# Patient Record
Sex: MALE | Race: WHITE | Employment: FULL TIME | URBAN - METROPOLITAN AREA
[De-identification: names, ages, dates, MRNs, and addresses within clinical notes are randomized per-mention and may not be internally consistent; named-entity substitution may affect disease eponyms.]

---

## 2018-12-07 ENCOUNTER — HOSPITAL ENCOUNTER (OUTPATIENT)
Age: 58
Discharge: HOME | End: 2018-12-09
Payer: COMMERCIAL

## 2018-12-07 ENCOUNTER — HOSPITAL ENCOUNTER (OUTPATIENT)
Dept: GENERAL RADIOLOGY | Age: 58
Discharge: HOME | End: 2018-12-09
Payer: COMMERCIAL

## 2018-12-07 DIAGNOSIS — R05.9 COUGH: ICD-10-CM

## 2018-12-07 PROCEDURE — 71046 X-RAY EXAM CHEST 2 VIEWS: CPT

## 2021-11-14 ENCOUNTER — APPOINTMENT (OUTPATIENT)
Dept: GENERAL RADIOLOGY | Age: 61
End: 2021-11-14
Payer: COMMERCIAL

## 2021-11-14 ENCOUNTER — HOSPITAL ENCOUNTER (EMERGENCY)
Age: 61
Discharge: HOME | End: 2021-11-14
Attending: STUDENT IN AN ORGANIZED HEALTH CARE EDUCATION/TRAINING PROGRAM
Payer: COMMERCIAL

## 2021-11-14 VITALS
OXYGEN SATURATION: 100 % | HEART RATE: 68 BPM | SYSTOLIC BLOOD PRESSURE: 158 MMHG | RESPIRATION RATE: 16 BRPM | BODY MASS INDEX: 28 KG/M2 | TEMPERATURE: 98.2 F | DIASTOLIC BLOOD PRESSURE: 91 MMHG | HEIGHT: 71 IN | WEIGHT: 200 LBS

## 2021-11-14 DIAGNOSIS — I10 HYPERTENSION, UNSPECIFIED TYPE: ICD-10-CM

## 2021-11-14 DIAGNOSIS — R07.9 CHEST PAIN, UNSPECIFIED TYPE: Primary | ICD-10-CM

## 2021-11-14 LAB
ALBUMIN SERPL-MCNC: 3.9 G/DL (ref 3.5–5.2)
ALP BLD-CCNC: 85 U/L (ref 40–129)
ALT SERPL-CCNC: 55 U/L (ref 0–40)
ANION GAP SERPL CALCULATED.3IONS-SCNC: 10 MMOL/L (ref 7–16)
AST SERPL-CCNC: 46 U/L (ref 0–39)
BASOPHILS ABSOLUTE: 0.02 E9/L (ref 0–0.2)
BASOPHILS RELATIVE PERCENT: 0.3 % (ref 0–2)
BILIRUB SERPL-MCNC: 0.4 MG/DL (ref 0–1.2)
BUN BLDV-MCNC: 31 MG/DL (ref 6–23)
CALCIUM SERPL-MCNC: 9.6 MG/DL (ref 8.6–10.2)
CHLORIDE BLD-SCNC: 106 MMOL/L (ref 98–107)
CO2: 25 MMOL/L (ref 22–29)
CREAT SERPL-MCNC: 2.1 MG/DL (ref 0.7–1.2)
EKG ATRIAL RATE: 59 BPM
EKG P AXIS: 61 DEGREES
EKG P-R INTERVAL: 184 MS
EKG Q-T INTERVAL: 422 MS
EKG QRS DURATION: 88 MS
EKG QTC CALCULATION (BAZETT): 417 MS
EKG R AXIS: 2 DEGREES
EKG T AXIS: 73 DEGREES
EKG VENTRICULAR RATE: 59 BPM
EOSINOPHILS ABSOLUTE: 0.17 E9/L (ref 0.05–0.5)
EOSINOPHILS RELATIVE PERCENT: 2.3 % (ref 0–6)
GFR AFRICAN AMERICAN: 39
GFR NON-AFRICAN AMERICAN: 32 ML/MIN/1.73
GLUCOSE BLD-MCNC: 187 MG/DL (ref 74–99)
HCT VFR BLD CALC: 42.5 % (ref 37–54)
HEMOGLOBIN: 13.9 G/DL (ref 12.5–16.5)
IMMATURE GRANULOCYTES #: 0.06 E9/L
IMMATURE GRANULOCYTES %: 0.8 % (ref 0–5)
LYMPHOCYTES ABSOLUTE: 1.3 E9/L (ref 1.5–4)
LYMPHOCYTES RELATIVE PERCENT: 17.6 % (ref 20–42)
MCH RBC QN AUTO: 30.5 PG (ref 26–35)
MCHC RBC AUTO-ENTMCNC: 32.7 % (ref 32–34.5)
MCV RBC AUTO: 93.4 FL (ref 80–99.9)
METER GLUCOSE: 225 MG/DL (ref 74–99)
MONOCYTES ABSOLUTE: 0.91 E9/L (ref 0.1–0.95)
MONOCYTES RELATIVE PERCENT: 12.3 % (ref 2–12)
NEUTROPHILS ABSOLUTE: 4.91 E9/L (ref 1.8–7.3)
NEUTROPHILS RELATIVE PERCENT: 66.7 % (ref 43–80)
PDW BLD-RTO: 13.2 FL (ref 11.5–15)
PLATELET # BLD: 187 E9/L (ref 130–450)
PMV BLD AUTO: 10.1 FL (ref 7–12)
POTASSIUM REFLEX MAGNESIUM: 3.8 MMOL/L (ref 3.5–5)
RBC # BLD: 4.55 E12/L (ref 3.8–5.8)
SODIUM BLD-SCNC: 141 MMOL/L (ref 132–146)
TOTAL PROTEIN: 6 G/DL (ref 6.4–8.3)
TROPONIN, HIGH SENSITIVITY: 34 NG/L (ref 0–11)
TROPONIN, HIGH SENSITIVITY: 37 NG/L (ref 0–11)
TROPONIN, HIGH SENSITIVITY: 40 NG/L (ref 0–11)
TROPONIN, HIGH SENSITIVITY: 41 NG/L (ref 0–11)
WBC # BLD: 7.4 E9/L (ref 4.5–11.5)

## 2021-11-14 PROCEDURE — 99285 EMERGENCY DEPT VISIT HI MDM: CPT

## 2021-11-14 PROCEDURE — 6370000000 HC RX 637 (ALT 250 FOR IP): Performed by: FAMILY MEDICINE

## 2021-11-14 PROCEDURE — 93010 ELECTROCARDIOGRAM REPORT: CPT | Performed by: INTERNAL MEDICINE

## 2021-11-14 PROCEDURE — 82962 GLUCOSE BLOOD TEST: CPT

## 2021-11-14 PROCEDURE — 84484 ASSAY OF TROPONIN QUANT: CPT

## 2021-11-14 PROCEDURE — 36415 COLL VENOUS BLD VENIPUNCTURE: CPT

## 2021-11-14 PROCEDURE — 71045 X-RAY EXAM CHEST 1 VIEW: CPT

## 2021-11-14 PROCEDURE — 80053 COMPREHEN METABOLIC PANEL: CPT

## 2021-11-14 PROCEDURE — 85025 COMPLETE CBC W/AUTO DIFF WBC: CPT

## 2021-11-14 PROCEDURE — 93005 ELECTROCARDIOGRAM TRACING: CPT | Performed by: STUDENT IN AN ORGANIZED HEALTH CARE EDUCATION/TRAINING PROGRAM

## 2021-11-14 RX ORDER — FENTANYL CITRATE 50 UG/ML
25 INJECTION, SOLUTION INTRAMUSCULAR; INTRAVENOUS ONCE
Status: DISCONTINUED | OUTPATIENT
Start: 2021-11-14 | End: 2021-11-15 | Stop reason: HOSPADM

## 2021-11-14 RX ADMIN — ASPIRIN 325 MG: 325 TABLET, COATED ORAL at 06:29

## 2021-11-14 ASSESSMENT — PAIN DESCRIPTION - LOCATION: LOCATION: CHEST

## 2021-11-14 ASSESSMENT — PAIN DESCRIPTION - PAIN TYPE: TYPE: ACUTE PAIN

## 2021-11-14 ASSESSMENT — ENCOUNTER SYMPTOMS
CHEST TIGHTNESS: 0
SORE THROAT: 0
CONSTIPATION: 0
NAUSEA: 0
DIARRHEA: 0
COUGH: 0
ABDOMINAL PAIN: 0
VOMITING: 0
SHORTNESS OF BREATH: 0
RHINORRHEA: 0

## 2021-11-14 ASSESSMENT — PAIN SCALES - GENERAL
PAINLEVEL_OUTOF10: 0
PAINLEVEL_OUTOF10: 1

## 2021-11-14 NOTE — ED NOTES
Pt to ed for c/o chest pain, that woke him up, pt states it felt like the same as when he had a MI in January of 2021. Pt took a Nitro prior to arrival and relieved his pain. Pt is a/ox3. VSS. Wife at bedside.       Mario Medina RN  11/14/21 7827

## 2021-11-14 NOTE — ED PROVIDER NOTES
[facility]  Department of Emergency Medicine     Written by: Zak Oconnor DO  Patient Name: Brandyn Matias  Attending Provider: No att. providers found  Admit Date: 2021  6:04 AM  MRN: [id number]                   : 1960        Chief Complaint   Patient presents with    Chest Pain    - Chief complaint    Gabino Acosta is a 57 y.o. male past medical history of CKD, lymphoma, previous MI requiring stenting 2021 currently on dual antiplatelet therapy who presents to the ED with a chief complaint of chest pain. He states that he went to bed with some substernal discomfort last night and thought that it was heartburn. He woke up this morning at about 4:00 with continued substernal chest pain located in the middle of his chest.  He took a nitro at that time which improved the pain. States that the pain did come back after taking the nitro. He took a 2nd and it completely resolved the pain. Describes the pain as pressure and is non radiating. It is not affected by exertion. He denies any diaphoresis, fever, chills, shortness of breath, abdominal pain, nausea, vomiting. Review of Systems   Constitutional: Negative for chills, fatigue and fever. HENT: Negative for congestion, rhinorrhea and sore throat. Respiratory: Negative for cough, chest tightness and shortness of breath. Cardiovascular: Positive for chest pain. Negative for palpitations and leg swelling. Gastrointestinal: Negative for abdominal pain, constipation, diarrhea, nausea and vomiting. Genitourinary: Negative for dysuria and frequency. Neurological: Negative for dizziness and light-headedness. Psychiatric/Behavioral: Negative for confusion. All other systems reviewed and are negative. Physical Exam  Constitutional:       General: He is not in acute distress. Appearance: Normal appearance. HENT:      Head: Normocephalic and atraumatic.       Mouth/Throat:      Mouth: Mucous membranes are moist.      Pharynx: Oropharynx is clear. Eyes:      Extraocular Movements: Extraocular movements intact. Conjunctiva/sclera: Conjunctivae normal.   Cardiovascular:      Rate and Rhythm: Normal rate and regular rhythm. Pulses: Normal pulses. Heart sounds: Murmur (systolic) heard. Pulmonary:      Effort: Pulmonary effort is normal.      Breath sounds: Normal breath sounds. No wheezing. Abdominal:      General: Bowel sounds are normal. There is no distension. Palpations: Abdomen is soft. Tenderness: There is no abdominal tenderness. Musculoskeletal:         General: No swelling. Cervical back: Normal range of motion. Skin:     General: Skin is warm and dry. Neurological:      General: No focal deficit present. Mental Status: He is alert and oriented to person, place, and time. Cranial Nerves: No cranial nerve deficit. Psychiatric:         Attention and Perception: Attention normal.         Mood and Affect: Mood normal.          Procedures       MDM  Number of Diagnoses or Management Options  Chest pain, unspecified type  Hypertension, unspecified type  Diagnosis management comments: Brandyn Matias is a 57 y.o. male who presented to the ED with a chief complaint of chest pain. Patient was hemodynamically stable. Vitals were within normal limits. EKG showed sinus bradycardia with a rate of 59 bpm with no ST elevations or depressions. Laboratory tests were significant for elevated troponin of 41 with a repeat of 40 and 37, elevated creatinine of 2.1, otherwise unremarkable. Chest x-ray showed no acute process. With recent stent in January 2021 and chest pain that responded to nitroglycerin the decision was made to admit the patient. Patient requested he be transferred to [facility] to be seen by his cardiologist Dr. Christiana Galeano. Explained to patient that there could be a long wait times and we were not sure when he would get a bed.   He was still agreeable to be transferred to [facility] clinic discussed case with Dr. Magui Snyder at [facility] clinic who accepted the patient. ED Course as of 12/04/21 0138   Sun Nov 14, 2021   6291 EKG: This EKG is signed and interpreted by me. Rate: 59  Rhythm: Sinus  Interpretation: sinus bradycardia, no ST-Elevations or Depressions, , QRS 88, QTc 417  Comparison: no previous EKG available    [BB]   5187 ATTENDING PROVIDER ATTESTATION:     I have personally performed and/or participated in the history, exam, medical decision making, and procedures and agree with all pertinent clinical information unless otherwise noted. I have also reviewed and agree with the past medical, family and social history unless otherwise noted. I have discussed this patient in detail with the resident, and provided the instruction and education regarding the patient. My findings/plan: He has history of renal transplant, stent placed to being this year, CAD who is presenting for evaluation of chest discomfort. Patient notes that yesterday evening he began to have what he felt was initially indigestion. Notes that he took a nitro for the pain. Upon awakening this morning continue to have some chest pressure, took nitro and currently is pain-free. States that the symptoms are similar to what he felt when he had his heart attack earlier this year. Currently patient is in bed no acute distress. Denies any dizziness, lightness, chest pain or shortness of breath, nausea, vomiting, diarrhea. Notes that he did not have any episodes of diaphoresis when he is chest discomfort. Plan for imaging, labs, supportive care, likely admission. Patient states that he would like to be transferred to the [facility] clinic visits where he had a stent placed, discussed with him long wait times at this time. [BB]   1935 Patient to use his home medications that he brought. Still waiting for bed. [JV]   4366 I reevaluated patient at bedside. EKG was normal sinus. Troponins trended down. Patient states that he would rather go to the [facility] emergency room to be evaluated at this time. He wants to be closer to the doctors that take care of him. He is alert and oriented x4. He understands his situation. States that he is here for chest pain and will be transferred for cardiac work-up at the [facility]. States that he wants to leave [address] Patient was informed that he possibly could die or have permanent disability if he leaves AGAINST MEDICAL ADVICE. Mother attempted patient stable. Patient states that he would rather go. Patient will be leaving AGAINST MEDICAL ADVICE. [JV]      ED Course User Index  [BB] Gaby Hernandez DO  [JV] Adilene Martinez MD        ED Course as of 12/04/21 0138   Sun Nov 14, 2021   5017 EKG: This EKG is signed and interpreted by me. Rate: 59  Rhythm: Sinus  Interpretation: sinus bradycardia, no ST-Elevations or Depressions, , QRS 88, QTc 417  Comparison: no previous EKG available    [BB]   0080 ATTENDING PROVIDER ATTESTATION:     I have personally performed and/or participated in the history, exam, medical decision making, and procedures and agree with all pertinent clinical information unless otherwise noted. I have also reviewed and agree with the past medical, family and social history unless otherwise noted. I have discussed this patient in detail with the resident, and provided the instruction and education regarding the patient. My findings/plan: He has history of renal transplant, stent placed to being this year, CAD who is presenting for evaluation of chest discomfort. Patient notes that yesterday evening he began to have what he felt was initially indigestion. Notes that he took a nitro for the pain. Upon awakening this morning continue to have some chest pressure, took nitro and currently is pain-free. States that the symptoms are similar to what he felt when he had his heart attack earlier this year. Currently patient is in bed no acute distress. Denies any dizziness, lightness, chest pain or shortness of breath, nausea, vomiting, diarrhea. Notes that he did not have any episodes of diaphoresis when he is chest discomfort. Plan for imaging, labs, supportive care, likely admission. Patient states that he would like to be transferred to the [facility] visits where he had a stent placed, discussed with him long wait times at this time. [BB]   1935 Patient to use his home medications that he brought. Still waiting for bed. [JV]   7647 I reevaluated patient at bedside. EKG was normal sinus. Troponins trended down. Patient states that he would rather go to the [facility] emergency room to be evaluated at this time. He wants to be closer to the doctors that take care of him. He is alert and oriented x4. He understands his situation. States that he is here for chest pain and will be transferred for cardiac work-up at the [facility]. States that he wants to leave [address] Patient was informed that he possibly could die or have permanent disability if he leaves AGAINST MEDICAL ADVICE. Mother attempted patient stable. Patient states that he would rather go. Patient will be leaving AGAINST MEDICAL ADVICE. [JV]      ED Course User Index  [BB] Colten Metzger DO  [JV] Louise Corey MD       --------------------------------------------- PAST HISTORY ---------------------------------------------  Past Medical History:  has a past medical history of Cancer ([facility] [address] CKD (chronic kidney disease), [facility] Diabetes mellitus [facility] Hypercholesteremia, Hypertension, MI (myocardial infarction) ([facility] Myocardial infarction ([facility] and Proteinuria. Past Surgical History:  has a past surgical history that includes skin biopsy; back surgery; Colonoscopy; and Kidney transplant. Social History:  reports that he has never smoked.  He has never used smokeless tobacco. He reports current alcohol use. He reports that he does not use drugs. Family History: family history includes Heart Attack in his father; Kidney Disease in his mother. The patients home medications have been reviewed.     Allergies: Doxycycline, Ibuprofen, Ramipril, Cardura [doxazosin mesylate], and Keflex [cephalexin]    -------------------------------------------------- RESULTS -------------------------------------------------    Lab  Results for orders placed or performed during the hospital encounter of 11/14/21   CBC Auto Differential   Result Value Ref Range    WBC 7.4 4.5 - 11.5 E9/L    RBC 4.55 3.80 - 5.80 E12/L    Hemoglobin 13.9 12.5 - 16.5 g/dL    Hematocrit 42.5 37.0 - 54.0 %    MCV 93.4 80.0 - 99.9 fL    MCH 30.5 26.0 - 35.0 pg    MCHC 32.7 32.0 - 34.5 %    RDW 13.2 11.5 - 15.0 fL    Platelets 520 950 - 832 E9/L    MPV 10.1 7.0 - 12.0 fL    Neutrophils % 66.7 43.0 - 80.0 %    Immature Granulocytes % 0.8 0.0 - 5.0 %    Lymphocytes % 17.6 (L) 20.0 - 42.0 %    Monocytes % 12.3 (H) 2.0 - 12.0 %    Eosinophils % 2.3 0.0 - 6.0 %    Basophils % 0.3 0.0 - 2.0 %    Neutrophils Absolute 4.91 1.80 - 7.30 E9/L    Immature Granulocytes # 0.06 E9/L    Lymphocytes Absolute 1.30 (L) 1.50 - 4.00 E9/L    Monocytes Absolute 0.91 0.10 - 0.95 E9/L    Eosinophils Absolute 0.17 0.05 - 0.50 E9/L    Basophils Absolute 0.02 0.00 - 0.20 E9/L   Comprehensive Metabolic Panel w/ Reflex to MG   Result Value Ref Range    Sodium 141 132 - 146 mmol/L    Potassium reflex Magnesium 3.8 3.5 - 5.0 mmol/L    Chloride 106 98 - 107 mmol/L    CO2 25 22 - 29 mmol/L    Anion Gap 10 7 - 16 mmol/L    Glucose 187 (H) 74 - 99 mg/dL    BUN 31 (H) 6 - 23 mg/dL    CREATININE 2.1 (H) 0.7 - 1.2 mg/dL    GFR Non-African American 32 >=60 mL/min/1.73    GFR African American 39     Calcium 9.6 8.6 - 10.2 mg/dL    Total Protein 6.0 (L) 6.4 - 8.3 g/dL    Albumin 3.9 3.5 - 5.2 g/dL    Total Bilirubin 0.4 0.0 - 1.2 mg/dL Alkaline Phosphatase 85 40 - 129 U/L    ALT 55 (H) 0 - 40 U/L    AST 46 (H) 0 - 39 U/L   Troponin   Result Value Ref Range    Troponin, High Sensitivity 41 (H) 0 - 11 ng/L   Troponin   Result Value Ref Range    Troponin, High Sensitivity 40 (H) 0 - 11 ng/L   Troponin   Result Value Ref Range    Troponin, High Sensitivity 37 (H) 0 - 11 ng/L   Troponin   Result Value Ref Range    Troponin, High Sensitivity 34 (H) 0 - 11 ng/L   POCT Glucose   Result Value Ref Range    Meter Glucose 225 (H) 74 - 99 mg/dL   EKG 12 Lead   Result Value Ref Range    Ventricular Rate 59 BPM    Atrial Rate 59 BPM    P-R Interval 184 ms    QRS Duration 88 ms    Q-T Interval 422 ms    QTc Calculation (Bazett) 417 ms    P Axis 61 degrees    R Axis 2 degrees    T Axis 73 degrees   EKG 12 Lead   Result Value Ref Range    Ventricular Rate 65 BPM    Atrial Rate 65 BPM    P-R Interval 176 ms    QRS Duration 82 ms    Q-T Interval 410 ms    QTc Calculation (Bazett) 426 ms    P Axis 51 degrees    R Axis 7 degrees    T Axis 78 degrees       Radiology  XR CHEST PORTABLE   Final Result   No acute cardiopulmonary process is identified by portable chest x-ray. RECOMMENDATION:   PA and lateral exam may better evaluate  as clinically warranted. EKG:  This EKG is signed and interpreted by me. Rate: 59  Rhythm: Sinus  Interpretation: sinus bradycardia  Comparison: no previous EKG available      ------------------------- NURSING NOTES AND VITALS REVIEWED ---------------------------  Date / Time Roomed:  11/14/2021  6:04 AM  ED Bed Assignment:  21/21    The nursing notes within the ED encounter and vital signs as below have been reviewed. No data found. Oxygen Saturation Interpretation: Normal      ------------------------------------------ PROGRESS NOTES ------------------------------------------  Re-evaluation(s):  Time: 1100. Patients symptoms show no change  Repeat physical examination is not changed    Time: 1230.   Patients symptoms show no change  Repeat physical examination is not changed    I have spoken with the patient and discussed todays results, in addition to providing specific details for the plan of care and counseling regarding the diagnosis and prognosis. Their questions are answered at this time and they are agreeable with the plan.      --------------------------------- ADDITIONAL PROVIDER NOTES ---------------------------------  Consultations:  Spoke with Dr. Payton Velazoc from [facility],  They will admit this patient. This patient's ED course included: a personal history and physicial examination, re-evaluation prior to disposition, multiple bedside re-evaluations, cardiac monitoring and continuous pulse oximetry    This patient has remained hemodynamically stable during their ED course. Please note that the withdrawal or failure to initiate urgent interventions for this patient would likely result in a life threatening deterioration or permanent disability. Clinical Impression  1. Chest pain, unspecified type    2. Hypertension, unspecified type          Disposition  Patient's disposition: Transfer to [facility]  Patient's condition is fair. Patient was seen and evaluated by myself and my attending No att. providers found. Assessment and Plan discussed with attending provider, please see attestation for final plan of care.      DO Sarah Roman DO  Resident  11/14/21 Steve Campos, DO  12/04/21 7799

## 2021-11-15 LAB
EKG ATRIAL RATE: 65 BPM
EKG P AXIS: 51 DEGREES
EKG P-R INTERVAL: 176 MS
EKG Q-T INTERVAL: 410 MS
EKG QRS DURATION: 82 MS
EKG QTC CALCULATION (BAZETT): 426 MS
EKG R AXIS: 7 DEGREES
EKG T AXIS: 78 DEGREES
EKG VENTRICULAR RATE: 65 BPM

## 2021-11-15 PROCEDURE — 93010 ELECTROCARDIOGRAM REPORT: CPT | Performed by: INTERNAL MEDICINE

## 2021-11-15 NOTE — ED PROVIDER NOTES
65.  Normal sinus rhythm. Normal axis deviation. No QTC prolongation. Stable as compared to previous EKG. Oxygen Saturation Interpretation: Normal    The cardiac monitor revealed sinus rhythm with a heart rate in the 80s as interpreted by me. The cardiac monitor was ordered secondary to the patient's heart rate and to monitor the patient for dysrhythmia. Time: 2100  Re-evaluation. Patients symptoms show no change  Repeat physical examination is not changed      MDM: Patient accepted for transfer for chest pain observation at the [facility] around 9:00 this morning. [facility] has no beds available currently and patient is waiting for transport for available bed. Patient has been in the department for over 12 hours. Called into the room because the patient states that he had chest pain. States him and his wife are also hungry and would like something to eat. He adds that he has his home medications that he would like to take. Because the patient is a vague chest pain is refusing EKG and a troponin. EKG was normal sinus rhythm. No signs of acute ischemia. Delta troponin x3 trending down. I informed patient if he should take his home medications that he has in his pocket. During evaluation patient and his wife state that they want to leave [address] because they want to drive up to the [facility] to get a bed. They state that they want to be evaluated at their emergency department. I informed patient that if he leaves AGAINST MEDICAL ADVICE he could cause him serious injury that may be permanent or even die. Patient states that he understands. He is alert and oriented x4 and patient left AGAINST MEDICAL ADVICE. Accordingly this patient received 0 minutes of critical care time, excluding separately billable procedures.          Kacie Dallas MD  Resident  11/15/21 1007

## 2021-11-15 NOTE — ED NOTES
RN answered pts call light at this time. Pt states he wants to leave AMA. RN took pts vitals and made physician aware that pt wishes to leave AMA.      Sugey Doss RN  11/14/21 7809

## 2021-11-15 NOTE — ED NOTES
Patient's vitals were obtained and Patient was asked if he wanted anything else for pain. Patient stated 'No, because his pain comes and goes. Patient stated right now that he is not in any pain.      Faith Held  11/14/21 2012

## 2021-11-15 NOTE — ED NOTES
This nurse came onto shift when patient put his call light on to ask questions about the transfer to [address] [place] [address] Road. The [facility] at this time to verify insurance coverage. Patient was told that [facility]. Patient asked how long the transfer will take. Patient was told that [facility] still has to call back when a room has became available. The patient was told that [facility] still had no rooms available at 1900. Patient stated that he has not got the care he needs and no one has brought him anything to eat. A follow up with patient's nurse was asked at this time if she ordered the patient a meal. The patient's nurse stated she did order him a meal. A box lunch was offered to the patient and the patient said no due to his special meal diet he has he can not eat lunch meat. A offer was made to patient's family member that she coud leave to go get something that he would like or would be more suitable to his diet and she would be allowed back in. Patient said No to that as well.       Dawit Rodriguez Held  11/14/21 2002       Faith Held  11/14/21 2005

## 2021-11-15 NOTE — ED NOTES
Spoke with patient's daughter at this time, concern that patient has not seen a physician for \"a while\", Dr. Tan Tenorio aware. Staff sent to room to check on patient and address any needs. Will monitor.      Hoda Hickman RN  11/14/21 1924

## 2021-11-15 NOTE — ED NOTES
Patient refused pain medication at this time. Doctor and Nurse notified.      Faith Held  11/14/21 2002

## 2021-11-24 ENCOUNTER — HOSPITAL ENCOUNTER (OUTPATIENT)
Dept: CARDIAC REHAB | Age: 61
Setting detail: THERAPIES SERIES
Discharge: HOME | End: 2021-11-24
Payer: COMMERCIAL

## 2021-11-24 VITALS
DIASTOLIC BLOOD PRESSURE: 69 MMHG | HEART RATE: 68 BPM | OXYGEN SATURATION: 95 % | HEIGHT: 71 IN | WEIGHT: 204 LBS | RESPIRATION RATE: 20 BRPM | SYSTOLIC BLOOD PRESSURE: 112 MMHG | BODY MASS INDEX: 28.56 KG/M2

## 2021-11-24 RX ORDER — EZETIMIBE 10 MG/1
10 TABLET ORAL DAILY
COMMUNITY

## 2021-11-24 RX ORDER — ROSUVASTATIN CALCIUM 40 MG/1
40 TABLET, COATED ORAL EVERY EVENING
COMMUNITY

## 2021-11-24 ASSESSMENT — PATIENT HEALTH QUESTIONNAIRE - PHQ9
SUM OF ALL RESPONSES TO PHQ QUESTIONS 1-9: 3
SUM OF ALL RESPONSES TO PHQ QUESTIONS 1-9: 3

## 2021-11-29 ENCOUNTER — HOSPITAL ENCOUNTER (OUTPATIENT)
Dept: CARDIAC REHAB | Age: 61
Setting detail: THERAPIES SERIES
Discharge: HOME | End: 2021-11-29
Payer: COMMERCIAL

## 2021-11-29 PROCEDURE — 93798 PHYS/QHP OP CAR RHAB W/ECG: CPT

## 2021-12-01 ENCOUNTER — HOSPITAL ENCOUNTER (OUTPATIENT)
Dept: CARDIAC REHAB | Age: 61
Setting detail: THERAPIES SERIES
Discharge: HOME | End: 2021-12-01
Payer: COMMERCIAL

## 2021-12-01 PROCEDURE — 93798 PHYS/QHP OP CAR RHAB W/ECG: CPT

## 2021-12-03 ENCOUNTER — HOSPITAL ENCOUNTER (OUTPATIENT)
Dept: CARDIAC REHAB | Age: 61
Setting detail: THERAPIES SERIES
Discharge: HOME | End: 2021-12-03
Payer: COMMERCIAL

## 2021-12-03 PROCEDURE — 93798 PHYS/QHP OP CAR RHAB W/ECG: CPT

## 2021-12-06 ENCOUNTER — HOSPITAL ENCOUNTER (OUTPATIENT)
Dept: CARDIAC REHAB | Age: 61
Setting detail: THERAPIES SERIES
Discharge: HOME | End: 2021-12-06
Payer: COMMERCIAL

## 2021-12-06 PROCEDURE — 93798 PHYS/QHP OP CAR RHAB W/ECG: CPT

## 2021-12-08 ENCOUNTER — HOSPITAL ENCOUNTER (OUTPATIENT)
Dept: CARDIAC REHAB | Age: 61
Setting detail: THERAPIES SERIES
Discharge: HOME | End: 2021-12-08
Payer: COMMERCIAL

## 2021-12-08 PROCEDURE — 93798 PHYS/QHP OP CAR RHAB W/ECG: CPT

## 2021-12-10 ENCOUNTER — HOSPITAL ENCOUNTER (OUTPATIENT)
Dept: CARDIAC REHAB | Age: 61
Setting detail: THERAPIES SERIES
Discharge: HOME | End: 2021-12-10
Payer: COMMERCIAL

## 2021-12-10 PROCEDURE — 93798 PHYS/QHP OP CAR RHAB W/ECG: CPT

## 2021-12-13 ENCOUNTER — HOSPITAL ENCOUNTER (OUTPATIENT)
Dept: CARDIAC REHAB | Age: 61
Setting detail: THERAPIES SERIES
Discharge: HOME | End: 2021-12-13
Payer: COMMERCIAL

## 2021-12-13 PROCEDURE — 93798 PHYS/QHP OP CAR RHAB W/ECG: CPT

## 2021-12-15 ENCOUNTER — HOSPITAL ENCOUNTER (OUTPATIENT)
Dept: CARDIAC REHAB | Age: 61
Setting detail: THERAPIES SERIES
Discharge: HOME | End: 2021-12-15
Payer: COMMERCIAL

## 2021-12-15 PROCEDURE — 93798 PHYS/QHP OP CAR RHAB W/ECG: CPT

## 2021-12-17 ENCOUNTER — HOSPITAL ENCOUNTER (OUTPATIENT)
Dept: CARDIAC REHAB | Age: 61
Setting detail: THERAPIES SERIES
Discharge: HOME | End: 2021-12-17
Payer: COMMERCIAL

## 2021-12-17 PROCEDURE — 93798 PHYS/QHP OP CAR RHAB W/ECG: CPT

## 2021-12-20 ENCOUNTER — HOSPITAL ENCOUNTER (OUTPATIENT)
Dept: CARDIAC REHAB | Age: 61
Setting detail: THERAPIES SERIES
Discharge: HOME | End: 2021-12-20
Payer: COMMERCIAL

## 2021-12-20 PROCEDURE — 93798 PHYS/QHP OP CAR RHAB W/ECG: CPT

## 2021-12-22 ENCOUNTER — HOSPITAL ENCOUNTER (OUTPATIENT)
Dept: CARDIAC REHAB | Age: 61
Setting detail: THERAPIES SERIES
Discharge: HOME | End: 2021-12-22
Payer: COMMERCIAL

## 2021-12-22 PROCEDURE — 93798 PHYS/QHP OP CAR RHAB W/ECG: CPT

## 2021-12-27 ENCOUNTER — HOSPITAL ENCOUNTER (OUTPATIENT)
Dept: CARDIAC REHAB | Age: 61
Setting detail: THERAPIES SERIES
End: 2021-12-27
Payer: COMMERCIAL

## 2021-12-29 ENCOUNTER — TELEPHONE (OUTPATIENT)
Dept: CARDIAC REHAB | Age: 61
End: 2021-12-29

## 2021-12-29 NOTE — TELEPHONE ENCOUNTER
12/29/2021 11:30 am Nutrition: Spoke with patient on this date by phone confirming appointment for 1/3/2022 at 7:00 am. Will remain available.  Electronically signed by Robin Ames RD, MASON on 12/29/21 at 11:31 AM EST

## 2022-01-03 ENCOUNTER — HOSPITAL ENCOUNTER (OUTPATIENT)
Dept: CARDIAC REHAB | Age: 62
Setting detail: THERAPIES SERIES
End: 2022-01-03
Payer: COMMERCIAL

## 2022-01-03 ENCOUNTER — TELEPHONE (OUTPATIENT)
Dept: CARDIAC REHAB | Age: 62
End: 2022-01-03

## 2022-01-03 NOTE — TELEPHONE ENCOUNTER
Patient called in on 01/02/2022 and left a voicemail stated he needed to cancel his appointment for 01/03/2022 as well as his nutrition appointment for 01/03/2022. Both appointments canceled.

## 2022-01-07 ENCOUNTER — HOSPITAL ENCOUNTER (OUTPATIENT)
Dept: CARDIAC REHAB | Age: 62
Setting detail: THERAPIES SERIES
Discharge: HOME | End: 2022-01-07
Payer: COMMERCIAL

## 2022-01-07 PROCEDURE — 93798 PHYS/QHP OP CAR RHAB W/ECG: CPT

## 2022-01-10 ENCOUNTER — HOSPITAL ENCOUNTER (OUTPATIENT)
Dept: CARDIAC REHAB | Age: 62
Setting detail: THERAPIES SERIES
Discharge: HOME | End: 2022-01-10
Payer: COMMERCIAL

## 2022-01-10 PROCEDURE — 93798 PHYS/QHP OP CAR RHAB W/ECG: CPT

## 2022-01-12 ENCOUNTER — HOSPITAL ENCOUNTER (OUTPATIENT)
Dept: CARDIAC REHAB | Age: 62
Setting detail: THERAPIES SERIES
Discharge: HOME | End: 2022-01-12
Payer: COMMERCIAL

## 2022-01-12 PROCEDURE — 93798 PHYS/QHP OP CAR RHAB W/ECG: CPT

## 2022-01-14 ENCOUNTER — HOSPITAL ENCOUNTER (OUTPATIENT)
Dept: CARDIAC REHAB | Age: 62
Setting detail: THERAPIES SERIES
Discharge: HOME | End: 2022-01-14
Payer: COMMERCIAL

## 2022-01-14 PROCEDURE — 93798 PHYS/QHP OP CAR RHAB W/ECG: CPT

## 2022-01-19 ENCOUNTER — HOSPITAL ENCOUNTER (OUTPATIENT)
Dept: CARDIAC REHAB | Age: 62
Setting detail: THERAPIES SERIES
Discharge: HOME | End: 2022-01-19
Payer: COMMERCIAL

## 2022-01-19 ENCOUNTER — TELEPHONE (OUTPATIENT)
Dept: CARDIAC REHAB | Age: 62
End: 2022-01-19

## 2022-01-19 PROCEDURE — 93798 PHYS/QHP OP CAR RHAB W/ECG: CPT

## 2022-01-19 NOTE — TELEPHONE ENCOUNTER
1/19/2022 9:33 am Nutrition: Spoke with patient on this date by phone and nutrition appointment rescheduled for 1/20/2022 at 7:30 am. Will remain available.  Electronically signed by Kay Mayorga RD, LD on 1/19/22 at 9:33 AM EST

## 2022-01-20 ENCOUNTER — HOSPITAL ENCOUNTER (OUTPATIENT)
Dept: CARDIAC REHAB | Age: 62
Setting detail: THERAPIES SERIES
Discharge: HOME | End: 2022-01-20
Payer: COMMERCIAL

## 2022-01-20 VITALS — BODY MASS INDEX: 28.25 KG/M2 | HEIGHT: 71 IN | WEIGHT: 201.8 LBS

## 2022-01-20 PROCEDURE — 93798 PHYS/QHP OP CAR RHAB W/ECG: CPT

## 2022-01-20 PROCEDURE — 93797 PHYS/QHP OP CAR RHAB WO ECG: CPT

## 2022-01-20 NOTE — PROGRESS NOTES
Cardiac Rehab Nutrition Assessment          NAME: Mookie Quinonez : 1960 AGE: 57 y.o. GENDER: male    CARDIAC REHAB ADMITTING DIAGNOSIS: MI ( 2021)      PROBLEM LIST:   history of MI, (2021) Diabetic ( steroid induced per pt), Hypercholesterolemia, Hypertension,  history of Stage IV Chronic Kidney Disease, s/p Kidney transplant ( 3/2017) Basal Cell Cancer, CAD? Proteinuria, remote history of 3 lower back surgeries, skin biopsy, colonoscopy    Patient is immunosuppressed due on anti-rejection medications per order. LABS:  10/21/2021 HgA1c 8.1 (high)    Lab Results   Component Value Date    CHOL 144 2012    HDL 39.0 2012    LDLCALC 66 2012    TRIG 195 2012     Update: 2021 Cholesterol: 129; Triglyceride: 103; HDL 54; LDL 54     MEDICATIONS/SUPPLEMENTS:    Scheduled Meds:see list  Continuous Infusions:not applicable  PRN Meds:.none currently seen    Prior to Admission medications    Medication Sig Start Date End Date Taking?  Authorizing Provider   rosuvastatin (CRESTOR) 40 MG tablet Take 40 mg by mouth every evening    Historical Provider, MD   ezetimibe (ZETIA) 10 MG tablet Take 10 mg by mouth daily    Historical Provider, MD   ticagrelor (BRILINTA) 90 MG TABS tablet Take 90 mg by mouth 2 times daily    Historical Provider, MD   tacrolimus (PROGRAF) 0.5 MG capsule Take 1.5 mg by mouth 2 times daily     Historical Provider, MD   predniSONE (DELTASONE) 10 MG tablet Take 10 mg by mouth daily 19   Historical Provider, MD   sulfamethoxazole-trimethoprim (BACTRIM;SEPTRA) 400-80 MG per tablet Take 80 mg by mouth daily  19   Historical Provider, MD   LANWEIUS SOLOSTAR 100 UNIT/ML injection pen Inject 20 Units into the skin nightly 19   Historical Provider, MD   insulin lispro (HUMALOG KWIKPEN) 100 UNIT/ML pen Inject 6 Units into the skin 3 times daily 19   Historical Provider, MD   Melatonin 10 MG CAPS Take 10 mg by mouth as needed    Historical Provider, MD nebivolol (BYSTOLIC) 5 MG tablet Take 5 mg by mouth daily     Historical Provider, MD   diltiazem (DILACOR XR) 240 MG XR capsule Take 240 mg by mouth daily. Historical Provider, MD   therapeutic multivitamin-minerals (THERAGRAN-M) tablet Take 1 tablet by mouth daily. Historical Provider, MD        Pertinent Lipid Lowering Drugs: Crestor and Zetia- low cholesterol diet indicated. Anti-rejection medication:  Prograf- no grapefruit products. Brilanta- no grapefruit products    Deltasone, Lantus and Humalog- carbohydrate controlled diet indicated    Patient and wife verbalized understanding. ANTHROPOMETRICS:    Ht Readings from Last 1 Encounters:   01/20/22 5' 10.5\" (1.791 m)      Wt Readings from Last 10 Encounters:   01/20/22 201 lb 12.8 oz (91.5 kg)   11/24/21 204 lb (92.5 kg)   11/14/21 200 lb (90.7 kg)   07/17/19 214 lb (97.1 kg)   09/21/11 212 lb 6 oz (96.3 kg)                 IBW:169 pounds  +/- 10%       %IBW: 118%               BMI: 28.6 ( indicated overweight)  Patient appears overweight. Waist: 42 inches ( goal under 40 inches). Patient and wife verbalized understanding. Reported Wt Hx:Per chart, cardiac rehab admit weight: 204 pounds. Patient lost 3 pounds over 2 months indicating 1.4% weight loss.  Weight loss beneficial.      Reported Diet Hx:Patient eats 3 meals per day and not hungry in between meals; usually does not have evening snack or snack before breakfast.     Rate Your Plate Score: 62    (Score 58-72: Making many healthy choices; 41-57: Some choices need improving 24-40: many choices need improving)     24 HOUR DIET RECALL    Breakfast: 8 am, at home, 2 slices 696% whole grain wheat toast, 1 whole avocado, 1 banana, 1 large travel mug coffee ( plain)    Lunch: 12:30 pm, at home, 3 cups tossed salad ( spinach and spring mix, carrots, regular shedded cheddar cheese, sliced almonds) and sugar free Western [place] vinagrette salad dressing, 2 1/2 cups unsweetened ice tea Dinner: 5:45 pm, at home 1 salmon filet, 1 baked sweet potato with agave, light tub margarine and cinnamon, 1 cup frozen steamed green beans, 1/2 liter filtered water    Snacks: not applicable    Beverages: I cup regular large travel mug coffee; 2 1/2 to 3 liters water per day daily; does not drink  pop       Piper Quijano      Environmental/Social:No reports of inadequate appetite; No food allergies; follow low sodium, low carbohydrate , low fat diet, occassional alcohol; intake varies; one drink; ( drinks wine) no chewing or swallowing problems; own teeth; has adequate funds for food; patient and wife cook; shopping shared ; currently eating at sit down restaurants due to pandemic; no fast food; no cultural, Moravian or ethnic food preferences stated. NUTRITION INTERVENTION:    Nutrition 30 / 60 minute one-on-one education & goal setting with Piper Quijano and wife    Reviewed with Piper Quijano and wife Lizbet Dee) relevant labs compared to ideals. Reviewed weight history and patient's verbalized weight goal as well as any real or perceived barriers to obtaining the goal. Collaborated with patient to set a specific short and long term weight goal.    Conducted a verbal diet recall. Assessed for environmental, financial, psychosocial, physical and comorbidities that may impact the food and eating patterns / behaviors of Piper Macie with wife. Collaborated with patient to set specific nutrient goals as well as specific food / behavior changes that will help patient meet the overall goal of following a heart healthy eating pattern (using guidelines as set forth by the American Heart Association and modeled after healthful eating patterns as recognized by the USDA Dietary Guidelines such as DASH, Mediterranean or plant-based). Patient follows some aspects of Mediterranean diet.       Briefly reviewed with Piper Quijano  and wife the nutrition information:My Plate, Dietary Resolutions, Menu Planning, 3 day food log, Medical Nutrition Therapy (MNT) Heart Healthy Consistent Carbohydrate and MNT Mediterranean diet and encouraged Narinder Nelson and wife  to read thoroughly, ask questions as needed, and use for future reference for heart healthy nutrition information. Narinder Nelson and wife are not scheduled to participate in Cardiac Rehab group nutrition classes. PATIENT GOALS:     Weight Goals:Patient will lose 1 to 2 pounds every 2 weeks. Short Term Weight Goal: 200  lbs    Long Term Weight Goal:190  lbs       Nutrition Goals:Patient will follow combination of Mediterranean diet and My Plate guidelines. Daily Recommendations:Patient will eat at least 3 meals per day and 1 to 2 snacks per day to meet estimated needs. Calories: 2200 calories minus calories for weight loss     (using [address] 8073 with AF 1.3)    Estimated protein needs: 61 to 76 grams/day ( based on 0.8 to one gram/day Ideal Body Weight)     Estimated total fluid needs: 2200 to 2700 ml/day ( based on 25 to 30 ml actual body weight)     Saturated Fat: no more than 20  g/day    Trans Fat: 0 g/day    Sodium: no more than 2000 mg/day    Fruit: 3 cups / day    Vegetables: 2 cups/day     Other:    - read and compare food labels    -have evening snack to maintain glucose    -have snack before exercise and test blood glucose before he exercises. Keeping a food diary was recommended. Patient is a phase 2 participant and ITP done by exercise physiology staff. Questions addressed. Follow-up plans discussed. Patient scheduled for follow up on 2/7/2022. Contact information provided. Narinder Nelson verbalized understanding. Ruthie Jaime MA, RDN, LD  Phone [phone]. n

## 2022-01-24 ENCOUNTER — HOSPITAL ENCOUNTER (OUTPATIENT)
Dept: CARDIAC REHAB | Age: 62
Setting detail: THERAPIES SERIES
Discharge: HOME | End: 2022-01-24
Payer: COMMERCIAL

## 2022-01-24 PROCEDURE — 93798 PHYS/QHP OP CAR RHAB W/ECG: CPT

## 2022-01-26 ENCOUNTER — HOSPITAL ENCOUNTER (OUTPATIENT)
Dept: CARDIAC REHAB | Age: 62
Setting detail: THERAPIES SERIES
Discharge: HOME | End: 2022-01-26
Payer: COMMERCIAL

## 2022-01-26 PROCEDURE — 93798 PHYS/QHP OP CAR RHAB W/ECG: CPT

## 2022-01-28 ENCOUNTER — HOSPITAL ENCOUNTER (OUTPATIENT)
Dept: CARDIAC REHAB | Age: 62
Setting detail: THERAPIES SERIES
Discharge: HOME | End: 2022-01-28
Payer: COMMERCIAL

## 2022-01-28 PROCEDURE — 93798 PHYS/QHP OP CAR RHAB W/ECG: CPT

## 2022-01-31 ENCOUNTER — HOSPITAL ENCOUNTER (OUTPATIENT)
Dept: CARDIAC REHAB | Age: 62
Setting detail: THERAPIES SERIES
Discharge: HOME | End: 2022-01-31
Payer: COMMERCIAL

## 2022-01-31 PROCEDURE — 93798 PHYS/QHP OP CAR RHAB W/ECG: CPT

## 2022-02-07 ENCOUNTER — HOSPITAL ENCOUNTER (OUTPATIENT)
Dept: CARDIAC REHAB | Age: 62
Setting detail: THERAPIES SERIES
End: 2022-02-07
Payer: COMMERCIAL

## 2022-02-09 ENCOUNTER — HOSPITAL ENCOUNTER (OUTPATIENT)
Dept: CARDIAC REHAB | Age: 62
Setting detail: THERAPIES SERIES
Discharge: HOME | End: 2022-02-09
Payer: COMMERCIAL

## 2022-02-09 PROCEDURE — 93798 PHYS/QHP OP CAR RHAB W/ECG: CPT

## 2022-02-11 ENCOUNTER — HOSPITAL ENCOUNTER (OUTPATIENT)
Dept: CARDIAC REHAB | Age: 62
Setting detail: THERAPIES SERIES
Discharge: HOME | End: 2022-02-11
Payer: COMMERCIAL

## 2022-02-11 PROCEDURE — 93798 PHYS/QHP OP CAR RHAB W/ECG: CPT

## 2022-02-14 ENCOUNTER — HOSPITAL ENCOUNTER (OUTPATIENT)
Dept: CARDIAC REHAB | Age: 62
Setting detail: THERAPIES SERIES
Discharge: HOME | End: 2022-02-14
Payer: COMMERCIAL

## 2022-02-14 PROCEDURE — 93798 PHYS/QHP OP CAR RHAB W/ECG: CPT

## 2022-02-16 ENCOUNTER — APPOINTMENT (OUTPATIENT)
Dept: CARDIAC REHAB | Age: 62
End: 2022-02-16
Payer: COMMERCIAL

## 2022-02-18 ENCOUNTER — APPOINTMENT (OUTPATIENT)
Dept: CARDIAC REHAB | Age: 62
End: 2022-02-18
Payer: COMMERCIAL

## 2022-02-21 ENCOUNTER — APPOINTMENT (OUTPATIENT)
Dept: CARDIAC REHAB | Age: 62
End: 2022-02-21
Payer: COMMERCIAL

## 2022-03-11 ENCOUNTER — HOSPITAL ENCOUNTER (OUTPATIENT)
Dept: CARDIAC REHAB | Age: 62
Setting detail: THERAPIES SERIES
Discharge: HOME | End: 2022-03-11
Payer: COMMERCIAL

## 2022-03-11 PROCEDURE — 93798 PHYS/QHP OP CAR RHAB W/ECG: CPT

## 2022-03-14 ENCOUNTER — HOSPITAL ENCOUNTER (OUTPATIENT)
Dept: CARDIAC REHAB | Age: 62
Setting detail: THERAPIES SERIES
Discharge: HOME | End: 2022-03-14
Payer: COMMERCIAL

## 2022-03-14 PROCEDURE — 93798 PHYS/QHP OP CAR RHAB W/ECG: CPT

## 2022-03-16 ENCOUNTER — HOSPITAL ENCOUNTER (OUTPATIENT)
Dept: CARDIAC REHAB | Age: 62
Setting detail: THERAPIES SERIES
Discharge: HOME | End: 2022-03-16
Payer: COMMERCIAL

## 2022-03-16 PROCEDURE — 93798 PHYS/QHP OP CAR RHAB W/ECG: CPT

## 2022-03-18 ENCOUNTER — HOSPITAL ENCOUNTER (OUTPATIENT)
Dept: CARDIAC REHAB | Age: 62
Setting detail: THERAPIES SERIES
Discharge: HOME | End: 2022-03-18
Payer: COMMERCIAL

## 2022-03-18 PROCEDURE — 93798 PHYS/QHP OP CAR RHAB W/ECG: CPT

## 2022-03-22 ENCOUNTER — HOSPITAL ENCOUNTER (OUTPATIENT)
Dept: CARDIAC REHAB | Age: 62
Setting detail: THERAPIES SERIES
Discharge: HOME | End: 2022-03-22
Payer: COMMERCIAL

## 2022-03-22 PROCEDURE — 93798 PHYS/QHP OP CAR RHAB W/ECG: CPT

## 2022-03-23 ENCOUNTER — HOSPITAL ENCOUNTER (OUTPATIENT)
Dept: CARDIAC REHAB | Age: 62
Setting detail: THERAPIES SERIES
End: 2022-03-23
Payer: COMMERCIAL

## 2022-03-24 ENCOUNTER — HOSPITAL ENCOUNTER (OUTPATIENT)
Dept: CARDIAC REHAB | Age: 62
Setting detail: THERAPIES SERIES
Discharge: HOME | End: 2022-03-24
Payer: COMMERCIAL

## 2022-03-24 PROCEDURE — 93798 PHYS/QHP OP CAR RHAB W/ECG: CPT

## 2022-03-28 ENCOUNTER — HOSPITAL ENCOUNTER (OUTPATIENT)
Dept: CARDIAC REHAB | Age: 62
Setting detail: THERAPIES SERIES
Discharge: HOME | End: 2022-03-28
Payer: COMMERCIAL

## 2022-03-28 PROCEDURE — 93798 PHYS/QHP OP CAR RHAB W/ECG: CPT

## 2022-03-30 ENCOUNTER — HOSPITAL ENCOUNTER (OUTPATIENT)
Dept: CARDIAC REHAB | Age: 62
Setting detail: THERAPIES SERIES
End: 2022-03-30
Payer: COMMERCIAL

## 2022-04-01 ENCOUNTER — HOSPITAL ENCOUNTER (OUTPATIENT)
Dept: CARDIAC REHAB | Age: 62
Setting detail: THERAPIES SERIES
Discharge: HOME | End: 2022-04-01
Payer: COMMERCIAL

## 2022-04-01 PROCEDURE — 93798 PHYS/QHP OP CAR RHAB W/ECG: CPT

## 2022-04-04 ENCOUNTER — HOSPITAL ENCOUNTER (OUTPATIENT)
Dept: CARDIAC REHAB | Age: 62
Setting detail: THERAPIES SERIES
Discharge: HOME | End: 2022-04-04
Payer: COMMERCIAL

## 2022-04-04 PROCEDURE — 93798 PHYS/QHP OP CAR RHAB W/ECG: CPT

## 2022-04-06 ENCOUNTER — HOSPITAL ENCOUNTER (OUTPATIENT)
Dept: CARDIAC REHAB | Age: 62
Setting detail: THERAPIES SERIES
Discharge: HOME | End: 2022-04-06
Payer: COMMERCIAL

## 2022-04-06 PROCEDURE — 93798 PHYS/QHP OP CAR RHAB W/ECG: CPT

## 2022-04-08 ENCOUNTER — HOSPITAL ENCOUNTER (OUTPATIENT)
Dept: CARDIAC REHAB | Age: 62
Setting detail: THERAPIES SERIES
Discharge: HOME | End: 2022-04-08
Payer: COMMERCIAL

## 2022-04-08 PROCEDURE — 93798 PHYS/QHP OP CAR RHAB W/ECG: CPT

## 2022-04-11 ENCOUNTER — APPOINTMENT (OUTPATIENT)
Dept: CARDIAC REHAB | Age: 62
End: 2022-04-11
Payer: COMMERCIAL

## 2022-04-13 ENCOUNTER — APPOINTMENT (OUTPATIENT)
Dept: CARDIAC REHAB | Age: 62
End: 2022-04-13
Payer: COMMERCIAL

## 2024-06-04 ENCOUNTER — LAB REQUISITION (OUTPATIENT)
Dept: DERMATOPATHOLOGY | Facility: CLINIC | Age: 64
End: 2024-06-04
Payer: MEDICARE

## 2024-06-04 DIAGNOSIS — D04.4 CARCINOMA IN SITU OF SKIN OF SCALP AND NECK: ICD-10-CM

## 2024-06-04 DIAGNOSIS — D04.62 CARCINOMA IN SITU OF SKIN OF LEFT UPPER LIMB, INCLUDING SHOULDER: ICD-10-CM

## 2024-06-04 DIAGNOSIS — D03.112: ICD-10-CM

## 2024-06-04 DIAGNOSIS — L57.0 ACTINIC KERATOSIS: ICD-10-CM

## 2024-06-04 DIAGNOSIS — C44.519 BASAL CELL CARCINOMA OF SKIN OF OTHER PART OF TRUNK: ICD-10-CM

## 2024-06-04 DIAGNOSIS — L56.5 DISSEMINATED SUPERFICIAL ACTINIC POROKERATOSIS (DSAP): ICD-10-CM

## 2024-06-04 DIAGNOSIS — C44.612 BASAL CELL CARCINOMA OF SKIN OF RIGHT UPPER LIMB, INCLUDING SHOULDER: ICD-10-CM

## 2024-06-04 DIAGNOSIS — D04.5 CARCINOMA IN SITU OF SKIN OF TRUNK: ICD-10-CM

## 2024-06-04 DIAGNOSIS — D04.61 CARCINOMA IN SITU OF SKIN OF RIGHT UPPER LIMB, INCLUDING SHOULDER: ICD-10-CM

## 2024-06-04 PROCEDURE — 88321 CONSLTJ&REPRT SLD PREP ELSWR: CPT | Performed by: DERMATOLOGY

## 2024-06-05 LAB
PATH REPORT.FINAL DX SPEC: NORMAL
PATH REPORT.GROSS SPEC: NORMAL
PATH REPORT.MICROSCOPIC SPEC OTHER STN: NORMAL
PATH REPORT.RELEVANT HX SPEC: NORMAL
PATH REPORT.TOTAL CANCER: NORMAL
PATHOLOGY SYNOPTIC REPORT: NORMAL

## 2024-06-16 DIAGNOSIS — C43.9 MELANOMA OF SKIN (MULTI): ICD-10-CM

## 2024-06-16 NOTE — TUMOR BOARD NOTE
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    Recommendation:  Mohs surgery.    Review Multidisciplinary Cutaneous Oncology Conference recommendation with patient.  Continue routine follow up and total body skin exams with Jessica Mcdaniel    Follow Up:  Emeka Melendez      History and Physical Exam  Dermatologic History:   64 y.o. male with a biopsy of the right lateral inferior eyelid on 6/4/2024 showing a a non-ulcerated melanoma, lentigo maligna type, Breslow: 0.2 mm.    He is scheduled for Mohs surgery with Dr. López on 7/24/2024.        Pathology  Derm Consult: [id number]  Order: [id number]   Collected 6/4/2024 13:27       Status: Final result       Visible to patient: Yes (seen)       Dx: Disseminated superficial actinic poro...    0 Result Notes                Electronically signed by Shyann Suggs MD on 6/5/2024 at 1456   Case Summary Report   MELANOMA OF THE SKIN: Biopsy   8th Edition - Protocol posted: 3/23/2022MELANOMA OF THE SKIN: BIOPSY - A  SPECIMEN   Procedure  Biopsy, shave   Specimen Laterality  Right   TUMOR   Tumor Site  Skin of other and unspecified parts of face: Right lateral inferior eyelid        Histologic Type  Lentigo maligna melanoma   Maximum Tumor (Breslow) Thickness (Millimeters)  0.2 mm   Ulceration  Not identified   Anatomic ([place]) Level  II (melanoma present in but does not fill and expand papillary dermis)   Mitotic Rate  None identified   Microsatellite(s)  Not identified   Lymphovascular Invasion  Not identified   Neurotropism  Not identified    Tumor-Infiltrating Lymphocytes  Not identified   Tumor Regression  Not identified   MARGINS     Margin Status for Invasive Melanoma  All margins negative for invasive melanoma   Margin Status for Melanoma in situ  Melanoma in situ present at margin   Margin(s) Involved by Melanoma in Situ  Peripheral     Deep   PATHOLOGIC STAGE CLASSIFICATION (pTNM, AJCC 8th Edition)     pT Category  pT1a   .      Clinical History

## 2024-06-17 ENCOUNTER — TUMOR BOARD CONFERENCE (OUTPATIENT)
Dept: HEMATOLOGY/ONCOLOGY | Facility: HOSPITAL | Age: 64
End: 2024-06-17
Payer: MEDICARE

## 2024-07-12 ENCOUNTER — TELEPHONE (OUTPATIENT)
Dept: DERMATOLOGY | Facility: CLINIC | Age: 64
End: 2024-07-12
Payer: MEDICARE

## 2024-07-24 ENCOUNTER — APPOINTMENT (OUTPATIENT)
Dept: DERMATOLOGY | Facility: CLINIC | Age: 64
End: 2024-07-24
Payer: MEDICARE

## 2024-07-24 VITALS — SYSTOLIC BLOOD PRESSURE: 138 MMHG | DIASTOLIC BLOOD PRESSURE: 80 MMHG | HEART RATE: 76 BPM

## 2024-07-24 DIAGNOSIS — C43.112: ICD-10-CM

## 2024-07-24 PROCEDURE — 17311 MOHS 1 STAGE H/N/HF/G: CPT | Performed by: DERMATOLOGY

## 2024-07-24 PROCEDURE — 17312 MOHS ADDL STAGE: CPT | Performed by: DERMATOLOGY

## 2024-07-24 PROCEDURE — 14301 TIS TRNFR ANY 30.1-60 SQ CM: CPT | Performed by: DERMATOLOGY

## 2024-07-24 PROCEDURE — 88342 IMHCHEM/IMCYTCHM 1ST ANTB: CPT | Performed by: DERMATOLOGY

## 2024-07-24 RX ORDER — NEBIVOLOL 5 MG/1
5 TABLET ORAL DAILY
COMMUNITY

## 2024-07-24 RX ORDER — EZETIMIBE 10 MG/1
10 TABLET ORAL DAILY
COMMUNITY

## 2024-07-24 RX ORDER — TACROLIMUS 0.5 MG/1
0.5 CAPSULE ORAL 2 TIMES DAILY
COMMUNITY

## 2024-07-24 RX ORDER — DILTIAZEM HYDROCHLORIDE 240 MG/1
240 CAPSULE, COATED, EXTENDED RELEASE ORAL DAILY
COMMUNITY

## 2024-07-24 RX ORDER — ROSUVASTATIN CALCIUM 40 MG/1
40 TABLET, COATED ORAL DAILY
COMMUNITY

## 2024-07-24 RX ORDER — PREDNISONE 5 MG/1
5 TABLET ORAL DAILY
COMMUNITY

## 2024-07-24 RX ORDER — SULFAMETHOXAZOLE AND TRIMETHOPRIM 400; 80 MG/1; MG/1
1 TABLET ORAL 2 TIMES DAILY
COMMUNITY

## 2024-07-24 NOTE — PROGRESS NOTES
Mohs Surgery Operative Note    Date of Surgery:  7/24/2024  Surgeon:  Emeka López MD  Office Location:  [address]   [address] [address]  Dept: [phone]  Dept Fax: [phone]  Referring Provider: Jessica Rodriguez, APRN-CNP  [address] [place] [address]      Assessment/Plan   Pre-procedure:   Obtained informed consent: written from patient  The surgical site was identified and confirmed with the patient.     Intra-operative:   Audible time out called at : 9:24 AM 07/24/24  by: Kayce Martines MA   Verified patient name, birthdate, site, specimen bottle label & requisition.    The planned procedure(s) was again reviewed with the patient. The risks of bleeding, infection, nerve damage and scarring were reviewed. Written authorization was obtained. The patient identity, surgical site, and planned procedure(s) were verified. The provider acted as both surgeon and pathologist.     Malignant melanoma of right lower eyelid including canthus (Multi)  Right Lateral Inferior Eyelid    Mohs surgery    Consent obtained: written    Universal Protocol:  Procedure explained and questions answered to patient or proxy's satisfaction: Yes    Test results available and properly labeled: Yes    Pathology report reviewed: Yes    External notes reviewed: Yes    Photo or diagram used for site identification: Yes    Site/side marked: Yes    Slide independently reviewed by Mohs surgeon: Yes    Immediately prior to procedure a time out was called: Yes    Patient identity confirmed: verbally with patient  Preparation: Patient was prepped and draped in usual sterile fashion      Anticoagulation:  Is the patient taking prescription anticoagulant and/or aspirin prescribed/recommended by a physician? Yes    Was the anticoagulation regimen changed prior to Mohs? No      Anesthesia:  Anesthesia method: local infiltration  Local anesthetic: lidocaine 1% WITH  epi    Procedure Details:  Biopsy accession number: [id number](outside path)  Date of biopsy: 6/4/2024  Pre-Op diagnosis: melanoma  Melanoma subtype: invasive  Melanoma Breslow depth (mm): 0.2  Melanoma histologic subtype: lentigo maligna  Surgery side: right  Surgical site (from skin exam): Right Lateral Inferior Eyelid  Pre-operative length (cm): 2.7  Pre-operative width (cm): 0.9  Indications for Mohs surgery: anatomic location where tissue conservation is critical  Previously treated? No      Micrographic Surgery Details:  Post-operative length (cm): 2.9  Post-operative width (cm): 1.3  Number of Mohs stages: 3    Stage 1     Comments: The patient was brought into the operating room and placed in the procedure chair in the appropriate position.  The area positive by previous biopsy was identified and confirmed with the patient. The area of clinically obvious tumor was debulked using a curette and/or scalpel as needed. An incision was made following the Mohs approach through the skin. The specimen was taken to the lab, divided into 2 piece(s) and appropriately chromacoded and processed.        .  The debulk showed melanoma in situ.     The specimen was processed using immunostains with Mart-1.  Tumor was seen on the lateral margins as indicated on the Mohs map.   [place]. Histologic examination revealed greater than 10 atypical melanocytes in a row lining the basement membrane.         Tumor features identified on Mohs section: melanoma     Depth of invasion comment: epidermis     Immunohistochemical stains utilized: MART-1    Stage 2     Comments: The area of positivity as noted on the Mohs map in the previous stage was identified and removed using the Mohs technique. The specimen was taken to the lab and appropriately chromacoded and processed in 3 piece(s).          The specimen was processed using immunostains with Mart-1.  Tumor was seen on the lateral margins as indicated on the Mohs map.   [place].  Histologic examination revealed greater than 10 atypical melanocytes in a row lining the basement membrane.       Tumor features identified on Mohs section: melanoma     Depth of invasion comment: epidermis     Immunohistochemical stains utilized: MART-1    Stage 3     Comments: The area of positivity as noted on the Mohs map in the previous stage was identified and removed using the Mohs technique. The specimen was taken to the lab and appropriately chromacoded and processed in 1 piece(s).     Tumor features identified on Mohs section: no tumor identified    Depth of defect: subcutaneous fat    Patient tolerance of procedure: tolerated well, no immediate complications    Reconstruction:  Was the defect reconstructed? Yes    Was reconstruction performed by the same Mohs surgeon? Yes    When was reconstruction performed? same day  Type of reconstruction: flap  Type of flap: rotation    Rotation flap type comment: unilateral  Flap area (cm2): 26  Subcutaneous Layers (Deep Stitches)   Suture size:  5-0 (6-0 Monocryl)  Suture type:  Vicryl  Stitches:  Buried vertical mattress  Fine/surface layer approximation (top stitches)   Epidermal/Superficial suture size:  5-0  Epidermal/Superficial suture type:  Fast-absorbing gut  Stitches: simple running    Hemostasis achieved with: suture, pressure and electrodesiccation  Outcome: patient tolerated procedure well with no complications    Post-procedure details: sterile dressing applied and wound care instructions given    Dressing type: pressure dressing    Additional details:  Melanoma Shay:   Curative Intent: Yes  Original Breslow Thickness: 0.2 mm  Clinical margin width: Other - Mohs, individual anatomic or functional considerations per the NCCN guidelines  Depth of excision: Other - Mohs, individual anatomic or functional considerations per the NCCN guidelines  Discussion/Procedural Comments:         Periosteal Flap: Due to the full thickness defect of the left lateral lower  eyelid, a modification to the below Tenzel flap included a periosteal anchoring flap. The periosteal flap was designed by taking a thin flap with an appropriate length and width coursing superolaterally onto the zygoma with a takeoff point at the lateral canthus. The flap was elevated from the lateral orbital rim and reflected medially and sewn to the remaining tarsus at the medial aspect of the eyelid defect.      Rotation Flap:  Due to geometric and functional constraints, a flap reconstruction was performed to reconstruct the defect. To that end, adjacent tissue was incised and carried over to close the defect in the following manner: Rotation flap Using skin marking ink, a rotation flap was designed to repair the defect and minimize functional and cosmetic distortion. Given the size, depth, and location of the defect, the surrounding structures and local tissue laxity, it was felt that a rotation flap was necessary to provide the best restoration of normal anatomy and function of the skin. The risks, benefits and likely outcome of the flap were discussed. The wound edges were refreshed to a 90 degree angle. The flap was cut and undermined extensively at the level of the subcutaneous plane. Standing cutaneous cones were removed using Burow's triangles. The deep tissue was elevated and the flap was rotated into position to close the primary defect using multiple key deep sutures. The remainder of the flap was then affixed with epidermal sutures. The flap measured 8.0 x 5.0 cm2.       The final repair measured 8.0 x 5.0 cm              Wound care was discussed, and the patient was given written post-operative wound care instructions.      The patient will follow up with Emeka López MD as needed for any post operative problems or concerns, and will follow up with their primary dermatologist as scheduled.  Melanoma Mohs Surgery Consult Note    Date of Surgery:  7/24/2024  Surgeon:  Emeka López  MD  Office Location:  [address]  [facility] [address]  Dept: [phone]  Dept Fax: [phone]   Referring Provider: Jessica Rodriguez, DARRION-CNP  [address] [place] [address]     Natalya Diaz is a 64 y.o. male who presents for the following: MOHS Surgery for melanoma.    According to the patient, the lesion has been present for approximately greater than 1 year at the time of diagnosis.  The lesion is not causing symptoms.  The lesion has not been treated previously.    The patient does not have a pacemaker / defibrillator.  The patient does not have a heart valve / joint replacement.    The patient is on blood thinners.  The patient does not have a history of hepatitis B or C.  The patient does not have a history of HIV.  The patient does have a history of immunosuppression (e.g. organ transplantation, malignancy, medications)    The following portions of the chart were reviewed this encounter and updated as appropriate:       Review of Systems:  No other skin or systemic complaints other than what is documented elsewhere in the note.    Medical History:  Clinically relevant history including significant past medical history, review of systems, medications and allergies was reviewed and is documented in Epic.    Objective   Well appearing patient in no apparent distress; mood and affect are within normal limits.  Vital signs: See record.  Noted on the Right Lateral Inferior Eyelid  Is a 2.7 x 0.9 cm scar      The patient confirmed the identified site.    Discussion:  The nature of the diagnosis was explained. The lesion is an early melanoma but is likely to have been present for >1 year and is likely to progress without treatment. The multidisciplinary cutaneous oncology tumor board report was reviewed with the patient and surgery is recommended. The patient was informed that based on the depth and lack of ulceration, a sentinel  lymph node biopsy is not indicated. However, the melanoma may be upstaged after excision following histopathologic examination, which may require additional treatment. Warning signs of melanoma were discussed. We recommended that the patient have regular total body skin exams given increased risk of skin cancers. The patient was instructed to use sun protective behaviors including use of broad spectrum sunscreens and sun protective clothing to reduce the risk of skin cancers.     Risks, benefits, side effects of Mohs surgery were discussed with patient and the patient voiced understanding.  It was explained that even though the cure rate of Mohs is very high it is not 100%. Risks of surgery including but not limited to bleeding, infection, numbness, nerve damage, and scar were reviewed.  Discussion included wound care requirements, activity restrictions, likely scar outcome and time to heal.     After Mohs surgery, the defect may need to be repaired surgically and the scar may be longer than the original lesion.  Reconstruction options, risks, and benefits were reviewed including second intention healing, linear repair (4-1 ratio was explained), local flaps, skin grafts, cartilage grafts and interpolation flaps (the need for multiple surgeries was explained). Possible outcomes were reviewed including likely scar appearance, failure of flap survival, infection, bleeding and the need for revision surgery.     Medical Decision Making:    Column 1 - chronic illness with progression  Column 2 - category 3: discussion of management with external physicians (multidisciplinary tumor board)  Column 3 - decision regarding minor surgery with identified risk factors (bleeding, infection, scarring)

## 2024-07-24 NOTE — LETTER
[facility] Provider/Referral Letter Treatment Plan    Patient: Mario Diaz   YOB: 1960   Date of Visit: 2024   MRN: [id number]     DARRION Green-CNP  [address] [place] [address]    Dear DARRION Green*,     I had the pleasure of seeing Mario Diaz today in consultation at your request for evaluation and treatment of:  1. 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  After informed consent was obtained, the patient underwent the procedure without complication.    The skin cancer was removed, wound care instructions were given and the patient was advised to follow up with you.  I will see the patient post-operatively as indicated.    Thank you very much for your confidence in me and for allowing me to share in the care of this patient.    1. Malignant melanoma of right lower eyelid including canthus (Multi)  Right Lateral Inferior Eyelid  Is a 2.7 x 0.9 cm scar    Mohs surgery    Consent obtained: written    Universal Protocol:  Procedure explained and questions answered to patient or proxy's satisfaction: Yes    Test results available and properly labeled: Yes    Pathology report reviewed: Yes    External notes reviewed: Yes    Photo or diagram used for site identification: Yes    Site/side marked: Yes    Slide independently reviewed by Mohs surgeon: Yes    Immediately prior to procedure a time out was called: Yes    Patient identity confirmed: verbally with patient  Preparation: Patient was prepped and draped in usual sterile fashion      Anticoagulation:  Is the patient taking prescription anticoagulant and/or aspirin  prescribed/recommended by a physician? Yes    Was the anticoagulation regimen changed prior to Mohs? No      Anesthesia:  Anesthesia method: local infiltration  Local anesthetic: lidocaine 1% WITH epi    Procedure Details:  Biopsy accession number: [id number](outside path)  Date of biopsy: 6/4/2024  Pre-Op diagnosis: melanoma  Melanoma subtype: invasive  Melanoma Breslow depth (mm): 0.2  Melanoma histologic subtype: lentigo maligna  Surgery side: right  Surgical site (from skin exam): Right Lateral Inferior Eyelid  Pre-operative length (cm): 2.7  Pre-operative width (cm): 0.9  Indications for Mohs surgery: anatomic location where tissue conservation is critical  Previously treated? No      Micrographic Surgery Details:  Post-operative length (cm): 2.9  Post-operative width (cm): 1.3  Number of Mohs stages: 3    Stage 1     Comments: The patient was brought into the operating room and placed in the procedure chair in the appropriate position.  The area positive by previous biopsy was identified and confirmed with the patient. The area of clinically obvious tumor was debulked using a curette and/or scalpel as needed. An incision was made following the Mohs approach through the skin. The specimen was taken to the lab, divided into 2 piece(s) and appropriately chromacoded and processed.        .  The debulk showed melanoma in situ.     The specimen was processed using immunostains with Mart-1.  Tumor was seen on the lateral margins as indicated on the Mohs map.   [place]. Histologic examination revealed greater than 10 atypical melanocytes in a row lining the basement membrane.         Tumor features identified on Mohs section: melanoma     Depth of invasion comment: epidermis     Immunohistochemical stains utilized: MART-1    Stage 2     Comments: The area of positivity as noted on the Mohs map in the previous stage was identified and removed using the Mohs technique. The specimen was taken to the lab and  appropriately chromacoded and processed in 3 piece(s).          The specimen was processed using immunostains with Mart-1.  Tumor was seen on the lateral margins as indicated on the Mohs map.   [place]. Histologic examination revealed greater than 10 atypical melanocytes in a row lining the basement membrane.       Tumor features identified on Mohs section: melanoma     Depth of invasion comment: epidermis     Immunohistochemical stains utilized: MART-1    Stage 3     Comments: The area of positivity as noted on the Mohs map in the previous stage was identified and removed using the Mohs technique. The specimen was taken to the lab and appropriately chromacoded and processed in 1 piece(s).     Tumor features identified on Mohs section: no tumor identified    Depth of defect: subcutaneous fat    Patient tolerance of procedure: tolerated well, no immediate complications    Reconstruction:  Was the defect reconstructed? Yes    Was reconstruction performed by the same Mohs surgeon? Yes    When was reconstruction performed? same day  Type of reconstruction: flap  Type of flap: rotation    Rotation flap type comment: unilateral  Flap area (cm2): 40  Subcutaneous Layers (Deep Stitches)   Suture size:  5-0 (6-0 Monocryl)  Suture type:  Vicryl  Stitches:  Buried vertical mattress  Fine/surface layer approximation (top stitches)   Epidermal/Superficial suture size:  5-0  Epidermal/Superficial suture type:  Fast-absorbing gut  Stitches: simple running    Hemostasis achieved with: suture, pressure and electrodesiccation  Outcome: patient tolerated procedure well with no complications    Post-procedure details: sterile dressing applied and wound care instructions given    Dressing type: pressure dressing    Additional details:  Melanoma Shay:   Curative Intent: Yes  Original Breslow Thickness: 0.2 mm  Clinical margin width: Other - Mohs, individual anatomic or functional considerations per the NCCN guidelines  Depth of  excision: Other - Mohs, individual anatomic or functional considerations per the NCCN guidelines  Discussion/Procedural Comments:       Staff Communication: Dermatology Local Anesthesia: 1 % Lidocaine / Epinephrine - Amount:34.ml's Tetracaine Ophthalmic 0.5%: 2drops Ativan 0.5m           Sincerely,       Emeka López MD  [facility]

## 2024-07-25 ENCOUNTER — OFFICE VISIT (OUTPATIENT)
Dept: DERMATOLOGY | Facility: CLINIC | Age: 64
End: 2024-07-25
Payer: MEDICARE

## 2024-07-25 DIAGNOSIS — Z51.89 VISIT FOR WOUND CHECK: ICD-10-CM

## 2024-07-25 PROCEDURE — 99024 POSTOP FOLLOW-UP VISIT: CPT | Performed by: DERMATOLOGY

## 2024-07-25 ASSESSMENT — DERMATOLOGY QUALITY OF LIFE (QOL) ASSESSMENT
WHAT SINGLE SKIN CONDITION LISTED BELOW IS THE PATIENT ANSWERING THE QUALITY-OF-LIFE ASSESSMENT QUESTIONS ABOUT: NONE OF THE ABOVE
RATE HOW BOTHERED YOU ARE BY EFFECTS OF YOUR SKIN PROBLEMS ON YOUR ACTIVITIES (EG, GOING OUT, ACCOMPLISHING WHAT YOU WANT, WORK ACTIVITIES OR YOUR RELATIONSHIPS WITH OTHERS): 0 - NEVER BOTHERED
RATE HOW EMOTIONALLY BOTHERED YOU ARE BY YOUR SKIN PROBLEM (FOR EXAMPLE, WORRY, EMBARRASSMENT, FRUSTRATION): 0 - NEVER BOTHERED
RATE HOW BOTHERED YOU ARE BY SYMPTOMS OF YOUR SKIN PROBLEM (EG, ITCHING, STINGING BURNING, HURTING OR SKIN IRRITATION): 0 - NEVER BOTHERED
RATE HOW EMOTIONALLY BOTHERED YOU ARE BY YOUR SKIN PROBLEM (FOR EXAMPLE, WORRY, EMBARRASSMENT, FRUSTRATION): 0 - NEVER BOTHERED
RATE HOW BOTHERED YOU ARE BY EFFECTS OF YOUR SKIN PROBLEMS ON YOUR ACTIVITIES (EG, GOING OUT, ACCOMPLISHING WHAT YOU WANT, WORK ACTIVITIES OR YOUR RELATIONSHIPS WITH OTHERS): 0 - NEVER BOTHERED
WHAT SINGLE SKIN CONDITION LISTED BELOW IS THE PATIENT ANSWERING THE QUALITY-OF-LIFE ASSESSMENT QUESTIONS ABOUT: NONE OF THE ABOVE
RATE HOW BOTHERED YOU ARE BY SYMPTOMS OF YOUR SKIN PROBLEM (EG, ITCHING, STINGING BURNING, HURTING OR SKIN IRRITATION): 0 - NEVER BOTHERED

## 2024-07-29 ASSESSMENT — DERMATOLOGY QUALITY OF LIFE (QOL) ASSESSMENT
RATE HOW BOTHERED YOU ARE BY SYMPTOMS OF YOUR SKIN PROBLEM (EG, ITCHING, STINGING BURNING, HURTING OR SKIN IRRITATION): 1
RATE HOW EMOTIONALLY BOTHERED YOU ARE BY YOUR SKIN PROBLEM (FOR EXAMPLE, WORRY, EMBARRASSMENT, FRUSTRATION): 1
RATE HOW BOTHERED YOU ARE BY EFFECTS OF YOUR SKIN PROBLEMS ON YOUR ACTIVITIES (EG, GOING OUT, ACCOMPLISHING WHAT YOU WANT, WORK ACTIVITIES OR YOUR RELATIONSHIPS WITH OTHERS): 1
RATE HOW BOTHERED YOU ARE BY EFFECTS OF YOUR SKIN PROBLEMS ON YOUR ACTIVITIES (EG, GOING OUT, ACCOMPLISHING WHAT YOU WANT, WORK ACTIVITIES OR YOUR RELATIONSHIPS WITH OTHERS): 1
WHAT SINGLE SKIN CONDITION LISTED BELOW IS THE PATIENT ANSWERING THE QUALITY-OF-LIFE ASSESSMENT QUESTIONS ABOUT: NONE OF THE ABOVE
RATE HOW EMOTIONALLY BOTHERED YOU ARE BY YOUR SKIN PROBLEM (FOR EXAMPLE, WORRY, EMBARRASSMENT, FRUSTRATION): 1
WHAT SINGLE SKIN CONDITION LISTED BELOW IS THE PATIENT ANSWERING THE QUALITY-OF-LIFE ASSESSMENT QUESTIONS ABOUT: NONE OF THE ABOVE
RATE HOW BOTHERED YOU ARE BY SYMPTOMS OF YOUR SKIN PROBLEM (EG, ITCHING, STINGING BURNING, HURTING OR SKIN IRRITATION): 1

## 2024-07-29 ASSESSMENT — PATIENT GLOBAL ASSESSMENT (PGA): WHAT IS THE PGA: PATIENT GLOBAL ASSESSMENT:  2 - MILD

## 2024-07-30 ENCOUNTER — APPOINTMENT (OUTPATIENT)
Dept: DERMATOLOGY | Facility: CLINIC | Age: 64
End: 2024-07-30
Payer: MEDICARE

## 2024-07-30 DIAGNOSIS — Z51.89 VISIT FOR WOUND CHECK: ICD-10-CM

## 2024-07-30 PROCEDURE — 99024 POSTOP FOLLOW-UP VISIT: CPT | Performed by: STUDENT IN AN ORGANIZED HEALTH CARE EDUCATION/TRAINING PROGRAM

## 2024-07-30 NOTE — PROGRESS NOTES
1. Visit for wound check  Right Eye    Office Follow Up Note    Visit Summary  Chief Complaint    1. Complaint Wound check.    Mario Diaz is a 64 y.o. male who presents for 6 day follow up after surgery for a melanoma. The patient has no concerns today.     Location Operation site location: Right Lateral Inferior Eyelid    On exam,  Mr. Diaz is well-appearing and in no apparent distress. The surgical site appears clean with minimal to no erythema. No tenderness and good wound edge apposition.    Assessment and Plan:  History of skin cancer requiring ongoing monitoring for recurrence and additional lesion development.   The patient was reassured that the wound is healing appropriately.   Photos taken  The dressing was removed, the wound cleaned a a new dressing reapplied.     The patient was advised on the importance of routine skin monitoring including follow up with general dermatology and instructed to call with any further concerns. The patient will return in 3 weeks/as needed    Chad Villagomez MD, PGY-5  Micrographic Surgery and Cutaneous Oncology Fellow  7/30/2024

## 2024-08-20 ENCOUNTER — APPOINTMENT (OUTPATIENT)
Dept: DERMATOLOGY | Facility: CLINIC | Age: 64
End: 2024-08-20
Payer: MEDICARE

## 2024-08-23 DIAGNOSIS — C43.9 MELANOMA OF SKIN (MULTI): ICD-10-CM

## 2024-08-23 NOTE — TUMOR BOARD NOTE
.General Patient Information  Name:  Mario Diaz  Evaluation #:  2  Conference Date:  8/26/2024  YOB: 1960  MRN:  [id number]  Program Physician(s):  Emeka López  Referring Physician(s):  Emeka Mckay      Summary   Stage:  IA ([id number]) ; Melanoma 5 year survival: 99%    Assessment:  Malignant melanoma of the right lateral inferior eyelid, Breslow thickness 0.2 mm, lentigo maligna type, without ulceration. S/p three stages of Mohs surgery with complete clearance. The debulking layer showed melanoma in situ. Adequately surgically treated.    Recommendation:  Annual H&P.    Review Multidisciplinary Cutaneous Oncology Conference recommendation with patient.  Continue routine follow up and total body skin exams with Jessica Mcdaniel    Follow Up:  Jessica Mcdaniel      History and Physical Exam  Dermatologic History:   64 y.o. male with a biopsy of the right lateral inferior eyelid on 6/4/2024 showing a a non-ulcerated melanoma, lentigo maligna type, Breslow: 0.2 mm. He underwent three stages of Mohs surgery with complete clearance on 7/24/2024. The debulking layer showed melanoma in situ.          Pathology  Derm Consult: [id number]  Order: [id number]   Collected 6/4/2024 13:27       Status: Final result       Visible to patient: Yes (seen)       Dx: Disseminated superficial actinic poro...    0 Result Notes                Electronically signed by Shyann Suggs MD on 6/5/2024 at 1456   Case Summary Report   MELANOMA OF THE SKIN: Biopsy   8th Edition - Protocol posted: 3/23/2022MELANOMA OF THE SKIN: BIOPSY - A  SPECIMEN   Procedure  Biopsy, shave   Specimen Laterality  Right   TUMOR   Tumor Site  Skin of other and unspecified parts of face: Right lateral inferior eyelid        Histologic Type  Lentigo maligna melanoma   Maximum Tumor (Breslow) Thickness (Millimeters)  0.2 mm   Ulceration  Not identified   Anatomic ([place]) Level  II (melanoma present in but does not fill and  expand papillary dermis)   Mitotic Rate  None identified   Microsatellite(s)  Not identified   Lymphovascular Invasion  Not identified   Neurotropism  Not identified   Tumor-Infiltrating Lymphocytes  Not identified   Tumor Regression  Not identified   MARGINS     Margin Status for Invasive Melanoma  All margins negative for invasive melanoma   Margin Status for Melanoma in situ  Melanoma in situ present at margin   Margin(s) Involved by Melanoma in Situ  Peripheral     Deep   PATHOLOGIC STAGE CLASSIFICATION (pTNM, AJCC 8th Edition)     pT Category  pT1a   .      Clinical History

## 2024-08-26 ENCOUNTER — APPOINTMENT (OUTPATIENT)
Dept: DERMATOLOGY | Facility: CLINIC | Age: 64
End: 2024-08-26
Payer: MEDICARE

## 2024-08-26 ENCOUNTER — TUMOR BOARD CONFERENCE (OUTPATIENT)
Dept: HEMATOLOGY/ONCOLOGY | Facility: HOSPITAL | Age: 64
End: 2024-08-26
Payer: MEDICARE

## 2024-08-26 DIAGNOSIS — Z51.89 VISIT FOR WOUND CHECK: ICD-10-CM

## 2024-08-26 PROCEDURE — 99024 POSTOP FOLLOW-UP VISIT: CPT | Performed by: STUDENT IN AN ORGANIZED HEALTH CARE EDUCATION/TRAINING PROGRAM

## 2024-08-26 NOTE — PROGRESS NOTES
Doing well. No pain, tenderness, or bleeding. Denies eye pain, grittiness, or dryness. No ectropion on exam. Patient very happy with progress. States he has increased eye secretions. Encouraged him that this should improve over time. Patient with spitting suture that was easily removed. Encouraged him to follow up as needed.     Chad Villagomez MD, PGY-5  , Department of Dermatology  Micrographic Surgery and Cutaneous Oncology Fellow  8/26/2024

## 2025-03-03 ENCOUNTER — OFFICE VISIT (OUTPATIENT)
Dept: PODIATRY | Age: 65
End: 2025-03-03
Payer: MEDICARE

## 2025-03-03 VITALS — BODY MASS INDEX: 28 KG/M2 | WEIGHT: 200 LBS | HEIGHT: 71 IN

## 2025-03-03 DIAGNOSIS — B35.1 ONYCHOMYCOSIS: Primary | ICD-10-CM

## 2025-03-03 DIAGNOSIS — R26.2 DIFFICULTY WALKING: ICD-10-CM

## 2025-03-03 DIAGNOSIS — M21.379 DROP FOOT GAIT: ICD-10-CM

## 2025-03-03 DIAGNOSIS — E11.51 TYPE II DIABETES MELLITUS WITH PERIPHERAL CIRCULATORY DISORDER (HCC): ICD-10-CM

## 2025-03-03 DIAGNOSIS — E11.42 DIABETIC POLYNEUROPATHY ASSOCIATED WITH TYPE 2 DIABETES MELLITUS (HCC): ICD-10-CM

## 2025-03-03 PROCEDURE — G8427 DOCREV CUR MEDS BY ELIG CLIN: HCPCS | Performed by: PODIATRIST

## 2025-03-03 PROCEDURE — 1123F ACP DISCUSS/DSCN MKR DOCD: CPT | Performed by: PODIATRIST

## 2025-03-03 PROCEDURE — 1036F TOBACCO NON-USER: CPT | Performed by: PODIATRIST

## 2025-03-03 PROCEDURE — 99203 OFFICE O/P NEW LOW 30 MIN: CPT | Performed by: PODIATRIST

## 2025-03-03 PROCEDURE — 1159F MED LIST DOCD IN RCRD: CPT | Performed by: PODIATRIST

## 2025-03-03 PROCEDURE — 3017F COLORECTAL CA SCREEN DOC REV: CPT | Performed by: PODIATRIST

## 2025-03-03 PROCEDURE — 3046F HEMOGLOBIN A1C LEVEL >9.0%: CPT | Performed by: PODIATRIST

## 2025-03-03 PROCEDURE — G8419 CALC BMI OUT NRM PARAM NOF/U: HCPCS | Performed by: PODIATRIST

## 2025-03-03 PROCEDURE — 2022F DILAT RTA XM EVC RTNOPTHY: CPT | Performed by: PODIATRIST

## 2025-03-03 NOTE — PROGRESS NOTES
New patient here for diabetic foot exam and callus of left foot. Job Rosenberg MD   Electronically signed by Awilda Carrera LPN on 3/3/2025 at 1:30 PM    
webspaces noted bilateral foot.  No additional calluses, ulcerations, heel fissuring noted bilateral foot.  MUSCULOSKELETAL: Cavus foot issues noted bilaterally.  Dropfoot issues noted with antalgic gait left lower extremity.  Dropfoot brace intact/solid, but is lacking proper padding and protection with insole support.    Plan Per Assessment  David was seen today for diabetes and callouses.    Diagnoses and all orders for this visit:    Onychomycosis    Diabetic polyneuropathy associated with type 2 diabetes mellitus (HCC)    Type II diabetes mellitus with peripheral circulatory disorder (HCC)    Drop foot gait    Difficulty walking        New patient evaluation and management  We did discuss appropriate care options with patient in detail today regarding the neuropathy symptoms.  Oral supplement was recommended.  We did discuss getting evaluated for diabetic insoles with prescription given today.  Patient is to continue with his good supportive shoe gear and bracing with daily activities.  Patient will be followed up at a later date for continued diabetic foot evaluation and management.  He was advised to call the office with any questions or concerns in the interim.      Seen By:    Quinn Ellington Jr, DPM    Electronically signed by Quinn Ellington Jr, DPM on 3/3/2025 at 2:03 PM      This note was created using voice recognition software.  The note was reviewed however may contain grammatical errors.

## 2025-03-07 ENCOUNTER — TELEPHONE (OUTPATIENT)
Dept: PODIATRY | Age: 65
End: 2025-03-07

## 2025-03-07 DIAGNOSIS — E11.42 DIABETIC POLYNEUROPATHY ASSOCIATED WITH TYPE 2 DIABETES MELLITUS (HCC): Primary | ICD-10-CM

## 2025-03-07 RX ORDER — LEUCOVORIN/PYRIDOX/MECOBALAMIN 4-50-2 MG
4-50 TABLET ORAL 2 TIMES DAILY
Qty: 90 TABLET | Refills: 2 | Status: SHIPPED | OUTPATIENT
Start: 2025-03-07

## 2025-03-07 NOTE — TELEPHONE ENCOUNTER
Patient is requesting supplement that was discussed at Mondays appointment to be sent in to his [facility] in [place] pharmacy. Thank you.

## 2025-05-05 ENCOUNTER — OFFICE VISIT (OUTPATIENT)
Dept: PODIATRY | Age: 65
End: 2025-05-05
Payer: MEDICARE

## 2025-05-05 VITALS — BODY MASS INDEX: 27.3 KG/M2 | HEIGHT: 71 IN | WEIGHT: 195 LBS

## 2025-05-05 DIAGNOSIS — E11.51 TYPE II DIABETES MELLITUS WITH PERIPHERAL CIRCULATORY DISORDER (HCC): ICD-10-CM

## 2025-05-05 DIAGNOSIS — B35.1 ONYCHOMYCOSIS: Primary | ICD-10-CM

## 2025-05-05 DIAGNOSIS — E11.42 DIABETIC POLYNEUROPATHY ASSOCIATED WITH TYPE 2 DIABETES MELLITUS (HCC): ICD-10-CM

## 2025-05-05 DIAGNOSIS — M21.379 DROP FOOT GAIT: ICD-10-CM

## 2025-05-05 DIAGNOSIS — R26.2 DIFFICULTY WALKING: ICD-10-CM

## 2025-05-05 PROCEDURE — 1036F TOBACCO NON-USER: CPT | Performed by: PODIATRIST

## 2025-05-05 PROCEDURE — 1159F MED LIST DOCD IN RCRD: CPT | Performed by: PODIATRIST

## 2025-05-05 PROCEDURE — 2022F DILAT RTA XM EVC RTNOPTHY: CPT | Performed by: PODIATRIST

## 2025-05-05 PROCEDURE — 3046F HEMOGLOBIN A1C LEVEL >9.0%: CPT | Performed by: PODIATRIST

## 2025-05-05 PROCEDURE — 99213 OFFICE O/P EST LOW 20 MIN: CPT | Performed by: PODIATRIST

## 2025-05-05 PROCEDURE — G8419 CALC BMI OUT NRM PARAM NOF/U: HCPCS | Performed by: PODIATRIST

## 2025-05-05 PROCEDURE — 1123F ACP DISCUSS/DSCN MKR DOCD: CPT | Performed by: PODIATRIST

## 2025-05-05 PROCEDURE — G8427 DOCREV CUR MEDS BY ELIG CLIN: HCPCS | Performed by: PODIATRIST

## 2025-05-05 PROCEDURE — 3017F COLORECTAL CA SCREEN DOC REV: CPT | Performed by: PODIATRIST

## 2025-05-05 RX ORDER — LEUCOVORIN/PYRIDOX/MECOBALAMIN 4-50-2 MG
4-50 TABLET ORAL 2 TIMES DAILY
Qty: 180 TABLET | Refills: 2 | Status: SHIPPED | OUTPATIENT
Start: 2025-05-05

## 2025-05-05 NOTE — PROGRESS NOTES
Patient is in today for 2 month follow up of blister to the ball of his left foot. Patient says he is doing better after a couple weeks of having a hard time with the area. Pcp is Job Rosenberg MD  Last ov 9/18/24

## 2025-05-05 NOTE — PROGRESS NOTES
25     David Albarran    : 1960   Sex: male    Age: 65 y.o.    Patient's PCP/Provider is:  Job Rosenberg MD    Subjective:  Patient is seen today for follow-up regarding continued care regarding diabetic foot evaluation and management.  Patient stated the blister areas have finally subsided left foot.  Overall patient is doing well at this time without any additional issues noted.  Patient did do well with the oral supplement regarding the neuropathy issues, and would like an additional prescription sent out on today's visit.  No other additional abnormalities noted.    Chief Complaint   Patient presents with    Follow-up     Blister left foot       ROS:  Const: Positives and pertinent negatives as per HPI.    Musculo: Denies symptoms other than stated above.  Neuro: Denies symptoms other than stated above.  Skin: Denies symptoms other than stated above.    Current Medications:    Current Outpatient Medications:     Folinic Acid-Vit B6-Vit B12 (FOLINIC-PLUS) 4-50-2 MG TABS, Take 4-50 mg by mouth 2 times daily, Disp: 180 tablet, Rfl: 2    rosuvastatin (CRESTOR) 40 MG tablet, Take 1 tablet by mouth every evening, Disp: , Rfl:     ezetimibe (ZETIA) 10 MG tablet, Take 1 tablet by mouth daily, Disp: , Rfl:     tacrolimus (PROGRAF) 0.5 MG capsule, Take 3 capsules by mouth 2 times daily, Disp: , Rfl:     predniSONE (DELTASONE) 10 MG tablet, Take 1 tablet by mouth daily, Disp: , Rfl: 1    sulfamethoxazole-trimethoprim (BACTRIM;SEPTRA) 400-80 MG per tablet, Take 1 tablet by mouth daily, Disp: , Rfl:     LANTUS SOLOSTAR 100 UNIT/ML injection pen, Inject 20 Units into the skin nightly, Disp: , Rfl: 0    insulin lispro (HUMALOG KWIKPEN) 100 UNIT/ML pen, Inject 6 Units into the skin 3 times daily, Disp: , Rfl:     Melatonin 10 MG CAPS, Take 1 capsule by mouth as needed, Disp: , Rfl:     nebivolol (BYSTOLIC) 5 MG tablet, Take 1 tablet by mouth daily, Disp: , Rfl:     diltiazem (DILACOR XR) 240 MG XR capsule, Take

## 2025-08-04 ENCOUNTER — OFFICE VISIT (OUTPATIENT)
Dept: PODIATRY | Age: 65
End: 2025-08-04
Payer: MEDICARE

## 2025-08-04 DIAGNOSIS — R26.2 DIFFICULTY WALKING: ICD-10-CM

## 2025-08-04 DIAGNOSIS — E11.51 TYPE II DIABETES MELLITUS WITH PERIPHERAL CIRCULATORY DISORDER (HCC): ICD-10-CM

## 2025-08-04 DIAGNOSIS — E11.42 DIABETIC POLYNEUROPATHY ASSOCIATED WITH TYPE 2 DIABETES MELLITUS (HCC): Primary | ICD-10-CM

## 2025-08-04 DIAGNOSIS — M21.379 DROP FOOT GAIT: ICD-10-CM

## 2025-08-04 PROCEDURE — 2022F DILAT RTA XM EVC RTNOPTHY: CPT | Performed by: PODIATRIST

## 2025-08-04 PROCEDURE — 3017F COLORECTAL CA SCREEN DOC REV: CPT | Performed by: PODIATRIST

## 2025-08-04 PROCEDURE — G8417 CALC BMI ABV UP PARAM F/U: HCPCS | Performed by: PODIATRIST

## 2025-08-04 PROCEDURE — 99213 OFFICE O/P EST LOW 20 MIN: CPT | Performed by: PODIATRIST

## 2025-08-04 PROCEDURE — 1036F TOBACCO NON-USER: CPT | Performed by: PODIATRIST

## 2025-08-04 PROCEDURE — 3046F HEMOGLOBIN A1C LEVEL >9.0%: CPT | Performed by: PODIATRIST

## 2025-08-04 PROCEDURE — 1159F MED LIST DOCD IN RCRD: CPT | Performed by: PODIATRIST

## 2025-08-04 PROCEDURE — 1123F ACP DISCUSS/DSCN MKR DOCD: CPT | Performed by: PODIATRIST

## 2025-08-04 PROCEDURE — G8427 DOCREV CUR MEDS BY ELIG CLIN: HCPCS | Performed by: PODIATRIST

## 2025-08-04 RX ORDER — LEUCOVORIN/PYRIDOX/MECOBALAMIN 4-50-2 MG
4-50 TABLET ORAL 2 TIMES DAILY
Qty: 180 TABLET | Refills: 2 | Status: SHIPPED | OUTPATIENT
Start: 2025-08-04